# Patient Record
Sex: FEMALE | Race: BLACK OR AFRICAN AMERICAN | NOT HISPANIC OR LATINO | Employment: UNEMPLOYED | URBAN - METROPOLITAN AREA
[De-identification: names, ages, dates, MRNs, and addresses within clinical notes are randomized per-mention and may not be internally consistent; named-entity substitution may affect disease eponyms.]

---

## 2022-05-17 ENCOUNTER — APPOINTMENT (OUTPATIENT)
Dept: GENERAL RADIOLOGY | Facility: HOSPITAL | Age: 27
End: 2022-05-17

## 2022-05-17 ENCOUNTER — HOSPITAL ENCOUNTER (EMERGENCY)
Facility: HOSPITAL | Age: 27
Discharge: HOME | End: 2022-05-17
Attending: EMERGENCY MEDICINE | Admitting: EMERGENCY MEDICINE

## 2022-05-17 VITALS
WEIGHT: 200 LBS | DIASTOLIC BLOOD PRESSURE: 78 MMHG | RESPIRATION RATE: 16 BRPM | BODY MASS INDEX: 30.31 KG/M2 | SYSTOLIC BLOOD PRESSURE: 128 MMHG | HEART RATE: 88 BPM | OXYGEN SATURATION: 97 % | HEIGHT: 68 IN | TEMPERATURE: 97.4 F
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
## 2022-05-17 DIAGNOSIS — S52.611A CLOSED DISPLACED FRACTURE OF STYLOID PROCESS OF RIGHT ULNA, INITIAL ENCOUNTER: ICD-10-CM

## 2022-05-17 DIAGNOSIS — S52.501A CLOSED FRACTURE OF DISTAL END OF RIGHT RADIUS, UNSPECIFIED FRACTURE MORPHOLOGY, INITIAL ENCOUNTER: Primary | ICD-10-CM

## 2022-05-17 PROCEDURE — 73070 X-RAY EXAM OF ELBOW: CPT

## 2022-05-17 PROCEDURE — 96374 THER/PROPH/DIAG INJ IV PUSH: CPT

## 2022-05-17 PROCEDURE — 25010000002 ONDANSETRON PER 1 MG: Performed by: EMERGENCY MEDICINE

## 2022-05-17 PROCEDURE — 73100 X-RAY EXAM OF WRIST: CPT

## 2022-05-17 PROCEDURE — 99283 EMERGENCY DEPT VISIT LOW MDM: CPT

## 2022-05-17 PROCEDURE — 25010000002 FENTANYL CITRATE (PF) 50 MCG/ML SOLUTION: Performed by: EMERGENCY MEDICINE

## 2022-05-17 PROCEDURE — 96375 TX/PRO/DX INJ NEW DRUG ADDON: CPT

## 2022-05-17 PROCEDURE — 25010000002 MORPHINE PER 10 MG: Performed by: EMERGENCY MEDICINE

## 2022-05-17 PROCEDURE — 73110 X-RAY EXAM OF WRIST: CPT

## 2022-05-17 RX ORDER — ONDANSETRON 2 MG/ML
4 INJECTION INTRAMUSCULAR; INTRAVENOUS ONCE
Status: COMPLETED | OUTPATIENT
Start: 2022-05-17 | End: 2022-05-17

## 2022-05-17 RX ORDER — FENTANYL CITRATE 50 UG/ML
100 INJECTION, SOLUTION INTRAMUSCULAR; INTRAVENOUS
Status: DISCONTINUED | OUTPATIENT
Start: 2022-05-17 | End: 2022-05-18 | Stop reason: HOSPADM

## 2022-05-17 RX ORDER — CETIRIZINE HYDROCHLORIDE 10 MG/1
10 TABLET ORAL DAILY
COMMUNITY

## 2022-05-17 RX ORDER — HYDROCODONE BITARTRATE AND ACETAMINOPHEN 7.5; 325 MG/1; MG/1
1 TABLET ORAL EVERY 6 HOURS PRN
Qty: 12 TABLET | Refills: 0 | Status: SHIPPED | OUTPATIENT
Start: 2022-05-17

## 2022-05-17 RX ORDER — MORPHINE SULFATE 2 MG/ML
4 INJECTION, SOLUTION INTRAMUSCULAR; INTRAVENOUS ONCE
Status: COMPLETED | OUTPATIENT
Start: 2022-05-17 | End: 2022-05-17

## 2022-05-17 RX ORDER — HYDROCODONE BITARTRATE AND ACETAMINOPHEN 7.5; 325 MG/1; MG/1
1 TABLET ORAL ONCE
Status: COMPLETED | OUTPATIENT
Start: 2022-05-17 | End: 2022-05-17

## 2022-05-17 RX ADMIN — FENTANYL CITRATE 100 MCG: 0.05 INJECTION, SOLUTION INTRAMUSCULAR; INTRAVENOUS at 22:46

## 2022-05-17 RX ADMIN — HYDROCODONE BITARTRATE AND ACETAMINOPHEN 1 TABLET: 7.5; 325 TABLET ORAL at 22:17

## 2022-05-17 RX ADMIN — ONDANSETRON 4 MG: 2 INJECTION INTRAMUSCULAR; INTRAVENOUS at 21:36

## 2022-05-17 RX ADMIN — MORPHINE SULFATE 4 MG: 2 INJECTION, SOLUTION INTRAMUSCULAR; INTRAVENOUS at 21:35

## 2022-05-18 NOTE — ED PROVIDER NOTES
EMERGENCY DEPARTMENT ENCOUNTER    Room Number: 02/02  Date seen: 5/17/2022  Time seen: 21:01 EDT  PCP: Provider, No Known    Spoken Language:  English  Language interpretation services not needed     CHIEF COMPLAINT: right wrist pain    HPI: Bello Regan is a 27 y.o. female presenting to the ED for evaluation of right wrist pain. The history is being obtained by the patient and by review of the medical chart.  The patient states that she was rollerskating prior to presentation to the emergency department whenever she fell backwards.  She states that she fell, landing on her right wrist.  She presents to the emergency department complaining of constant and severe pain in the right wrist.  She rates the severity as 10/10.  It is increased with any movement.  She denies any relieving factors.  The patient is not on any antiplatelet or anticoagulant therapy.  She did not hit her head.  She denies loss of consciousness.  She denies feeling dizzy or lightheaded prior to her fall.  She denies any additional injuries.    MEDICAL RECORD REVIEW:  Reviewed in [facility].     PAST MEDICAL HISTORY  Past Medical History:   Diagnosis Date   • Alopecia        PAST SURGICAL HISTORY  Past Surgical History:   Procedure Laterality Date   • WISDOM TOOTH EXTRACTION         FAMILY HISTORY  History reviewed. No pertinent family history.    SOCIAL HISTORY  Social History     Socioeconomic History   • Marital status: Single   Tobacco Use   • Smoking status: Never Smoker   • Smokeless tobacco: Never Used   Substance and Sexual Activity   • Alcohol use: Yes     Comment: socially   • Drug use: Yes     Types: Marijuana     Comment: occ   • Sexual activity: Defer       CURRENT MEDICATIONS  Prior to Admission medications    Not on File       ALLERGIES  Patient has no known allergies.    REVIEW OF SYSTEMS  All systems reviewed and negative except for those discussed in HPI.     PHYSICAL EXAM  ED Triage Vitals [05/17/22 2051]   Temp Heart Rate Resp BP SpO2    97.4 °F (36.3 °C) 112 20 124/95 99 %      Temp src Heart Rate Source Patient Position BP Location FiO2 (%)   Tympanic Monitor Standing Right arm --       Physical Exam  Constitutional:       Appearance: Normal appearance.   HENT:      Head: Normocephalic and atraumatic.      Mouth/Throat:      Mouth: Mucous membranes are moist.   Eyes:      Extraocular Movements: Extraocular movements intact.      Pupils: Pupils are equal, round, and reactive to light.   Cardiovascular:      Rate and Rhythm: Normal rate and regular rhythm.   Pulmonary:      Effort: Pulmonary effort is normal.      Breath sounds: Normal breath sounds.   Musculoskeletal:      Cervical back: Normal range of motion.      Comments: Obvious deformity noted to the right wrist with moderate swelling.  The patient has normal capillary refill and normal sensation to light touch to the fingers of the right hand.  2+ radial and ulnar pulses at the right wrist.   Skin:     General: Skin is warm and dry.   Neurological:      General: No focal deficit present.      Mental Status: She is alert and oriented to person, place, and time.   Psychiatric:         Mood and Affect: Mood normal.         Behavior: Behavior normal.         Thought Content: Thought content normal.         Judgment: Judgment normal.         PROCEDURES  FX Dislocation    Date/Time: 5/17/2022 11:40 PM  Performed by: Elaina Carty PA  Authorized by: Garfield Briggs MD     Consent:     Consent obtained:  Verbal    Consent given by:  Patient    Risks, benefits, and alternatives were discussed: yes      Alternatives discussed:  No treatment  Universal protocol:     Imaging studies available: yes      Patient identity confirmed:  Verbally with patient and arm band  Injury:     Injury location:  Wrist    Wrist injury location:  R wrist    Wrist fracture type comment:  Distal radius, ulnar styloid  Pre-procedure details:     Distal neurologic exam:  Normal    Distal perfusion: distal pulses  strong and brisk capillary refill      Range of motion: reduced    Sedation:     Sedation type:  None  Anesthesia:     Anesthesia method:  None  Procedure details:     Skin traction used: yes      Immobilization:  Splint    Splint type:  Sugar tong    Supplies used:  Sling    Additional details:  Ortho-Glass    Attestation: Splint applied and adjusted personally by me    Post-procedure details:     Distal neurologic exam:  Normal    Distal perfusion: distal pulses strong      Range of motion: unchanged      Procedure completion:  Tolerated      LABS  No results found for this or any previous visit (from the past 24 hour(s)).    RADIOLOGY  XR Wrist 3+ View Right   Final Result   As above.       This report was finalized on 5/17/2022 11:18 PM by Dr. Lottie Gutierrez M.D.          XR Wrist 2 View Right   Final Result   Comminuted fractures of the distal radius and ulnar styloid.       This report was finalized on 5/17/2022 9:58 PM by Dr. Lottie Gutierrez M.D.          XR Elbow 2 View Right   Final Result   No acute fracture or subluxation identified.       This report was finalized on 5/17/2022 10:24 PM by Dr. Lottie Gutierrez M.D.              MEDICATIONS GIVEN IN THE ER  Medications   fentaNYL citrate (PF) (SUBLIMAZE) injection 100 mcg (100 mcg Intravenous Given 5/17/22 2246)   morphine injection 4 mg (4 mg Intravenous Given by Other 5/17/22 2135)   ondansetron (ZOFRAN) injection 4 mg (4 mg Intravenous Given 5/17/22 2136)   HYDROcodone-acetaminophen (NORCO) 7.5-325 MG per tablet 1 tablet (1 tablet Oral Given 5/17/22 2217)       MEDICAL DECISION MAKING, CONSULTS AND PROGRESS NOTES  All labs and all radiology studies were have been viewed and interpreted by me.   Discussion below represents my analysis of pertinent findings related to patient's condition, differential diagnosis, treatment plan and final disposition.    Differential diagnosis includes but is not limited to:  - contusion  - wrist sprain  - bony  fracture    PPE: The patient was placed in a face mask in first look. Patient was wearing facemask when I entered the room and throughout our encounter. I wore full protective equipment throughout this patient encounter including a face mask, eye protection and gloves. Hand hygiene was performed before donning protective equipment and after removal when leaving the room.    AS OF 23:43 EDT VITALS:    BP - 130/86  HR - 112  TEMP - 97.4 °F (36.3 °C) (Tympanic)  O2 SATS - 99%         The patient's history, physical exam, and lab findings were discussed with the physician, who also performed a face to face history and physical exam.  I discussed all results and noted any abnormalities with patient.  Discussed absoute need to recheck abnormalities with their family physician.  I answered any of the patient's questions.  Discussed plan for discharge, as there is no emergent indication for admission.  Pt is agreeable and understands need for follow up and repeat testing.  Pt is aware that discharge does not mean that nothing is wrong but it indicates no emergency is present and they must continue care with their family physician.  Pt is discharged with instructions to follow up with primary care doctor to have their blood pressure rechecked.     DIAGNOSIS   Diagnosis Plan   1. Closed fracture of distal end of right radius, unspecified fracture morphology, initial encounter  HYDROcodone-acetaminophen (NORCO) 7.5-325 MG per tablet   2. Closed displaced fracture of styloid process of right ulna, initial encounter  HYDROcodone-acetaminophen (NORCO) 7.5-325 MG per tablet   3. Fall, initial encounter         DISPOSITION  ED Disposition     ED Disposition   Discharge    Condition   Stable    Comment   --             FOLLOW UP  Sandor Foreman MD  [address] [address]  [phone]      Call tomorrow to make an appointment to be seen      RX  Medications   fentaNYL citrate (PF) (SUBLIMAZE)  injection 100 mcg (100 mcg Intravenous Given 5/17/22 2246)   morphine injection 4 mg (4 mg Intravenous Given by Other 5/17/22 2135)   ondansetron (ZOFRAN) injection 4 mg (4 mg Intravenous Given 5/17/22 2136)   HYDROcodone-acetaminophen (NORCO) 7.5-325 MG per tablet 1 tablet (1 tablet Oral Given 5/17/22 2217)          Medication List      New Prescriptions    HYDROcodone-acetaminophen 7.5-325 MG per tablet  Commonly known as: NORCO  Take 1 tablet by mouth Every 6 (Six) Hours As Needed for Moderate Pain .           Where to Get Your Medications      These medications were sent to [id number] - [place], [place] - [address] [place] [address] AT [facility] [address]  [address] - [phone] [facility] [phone] [address] [address]    Phone: [phone]   · HYDROcodone-acetaminophen 7.5-325 MG per tablet       Provider Attestation:  I personally reviewed the past medical history, past surgical history, social history, family history, current medications and allergies as they appear in the chart. I reviewed the patient's history, physical, lab/imaging results and overall care with Dr. Briggs who is in agreement with the patient's treatment plan.    EMR Dragon/Transcription disclaimer:  Dictated using Dragon dictation    Provider note signed by:         Elaina Carty PA  05/17/22 3602

## 2022-05-18 NOTE — ED PROVIDER NOTES
MD ATTESTATION NOTE    The NIRMAL and I have discussed this patient's history, physical exam, and treatment plan.  I have reviewed the documentation and personally had a face to face interaction with the patient. I affirm the documentation and agree with the treatment and plan.  The attached note describes my personal findings.    I provided a substantive portion of the care of this patient. I personally performed the physical exam, in its entirety.    Bello Regna is a 27 y.o. female who presents to the ED c/o right wrist pain.  She reports this occurred just prior to arrival.  She was skating and she fell.  She reports injury to her right distal wrist.  She has no weakness or numbness.  She denies other injury.  Movement makes it worse and immobilization makes it better.      On exam:  GENERAL: Awake, alert, no acute distress  SKIN: Warm, dry  HENT: Normocephalic, atraumatic  EYES: no scleral icterus  CV: regular rhythm, regular rate  RESPIRATORY: normal effort, lungs clear  ABDOMEN: soft, nontender, nondistended  MUSCULOSKELETAL: Mild deformity to the distal right wrist.  Cap refill, sensation, motor intact all digits.  NEURO: alert, moves all extremities, follows commands    Labs  No results found for this or any previous visit (from the past 24 hour(s)).    Radiology  XR Elbow 2 View Right    Result Date: 5/17/2022  2 VIEWS RIGHT ELBOW  HISTORY: Right upper extremity pain after a fall  COMPARISON: None available.  FINDINGS: No acute fracture or subluxation of the right elbow is seen. There is no elbow effusion. There is no significant degenerative change.      No acute fracture or subluxation identified.  This report was finalized on 5/17/2022 10:24 PM by Dr. Lottie Gutierrez M.D.      XR Wrist 2 View Right    Result Date: 5/17/2022  2 VIEWS RIGHT WRIST  HISTORY: Right wrist pain  COMPARISON: None available.  FINDINGS: The patient has a comminuted impacted fracture of the distal radius, as well as a comminuted  fracture of the ulnar styloid. Both fractures are displaced. The radial fracture does extend to the articular surface. Carpal bones appear intact. There is soft tissue swelling noted about the wrist.      Comminuted fractures of the distal radius and ulnar styloid.  This report was finalized on 5/17/2022 9:58 PM by Dr. Lottie Gutierrez M.D.        Medical Decision Making:       Plan to x-ray her right wrist.  Plan to provide her pain medication for pain control.  I suspect she has a fracture and we will determine based on x-rays whether she needs reduction or not prior to splinting.    PPE: The patient wore a mask and I wore an N95 mask throughout the entire patient encounter.      The patient has started, but not completed, their COVID-19 vaccination series.    Diagnosis  Final diagnoses:   None        Garfield Briggs MD  05/17/22 7063

## 2022-05-18 NOTE — DISCHARGE INSTRUCTIONS
Although you are being discharged from the ED today, I encourage you to return for worsening symptoms. Things can, and do, change such that treatment at home with medication may not be adequate. Specifically I recommend returning for chest pain or discomfort, difficulty breathing, persistent vomiting or difficulty holding down liquids or medications, fever > 102.0 F or any other worsening or alarming symptoms.     Rest. Drink plenty of fluids.  Follow up with Dr. Foreman (orthopedic surgeon) for further evaluation and management.  Follow up with primary care provider for further management and to have blood pressure rechecked.  Take your medications as prescribed.  Do not drive, work, operate heavy machinery, or otherwise engage in any risky behavior while on narcotic pain medications or other sedating drugs. Do not sign any legal paperwork within 24 hours of taking narcotic pain medications or other sedating drugs. These medications may impair judgement and/or motor capabilities.    Narcotic Medication Notice:    Today you are receiving a medication with properties that cause sedation and/or drowsiness. Please use caution when taking this medication to avoid situations where decreased concentration or sleepiness may affect your performance of the task at hand. This includes driving vehicles, operating machinery, or any other task where your personal safety or the safety of others can be a risk. I do not recommend continuing any these activities while this medication until you know how it can affect you.     Also please be aware that these medications have been known to be habit forming. This means that even when taken as prescribed they can be addictive. Please pay attention to how and when you take this medication and be sensitive to the fact you may become addicted. Take only as directed. Do not change the manner in which you take this medication until you discuss with a licensed physician. Should addiction  occur I recommend discussing with your primary care doctor or return to the emergency department.    Lastly, the emergency department typically does not refill pain medication prescriptions due to limitations set out in [place] state laws. Any necessary refills need to be discussed with your primary care provider and/or pain management provider.    It is a [place] state law that you take your narcotic medication as prescribed and do not take any excessive pills. It is illegal to obtain prescriptions for any narcotic medications from multiple providers at the same time.

## 2022-05-18 NOTE — ED NOTES
"Pt arrives ambulatory to triage desk via PV.    Pt states \" I think I broke my wrist. I was skating and I fell.\"    This RN notes an obvious deformity, with swelling.    Patient was placed in face mask during first look triage.  Patient was wearing a face mask throughout encounter.  I wore personal protective equipment throughout the encounter.  Hand hygiene was performed before and after patient encounter.    "

## 2022-05-24 ENCOUNTER — LAB (OUTPATIENT)
Dept: LAB | Facility: HOSPITAL | Age: 27
End: 2022-05-24

## 2022-05-24 ENCOUNTER — HOSPITAL ENCOUNTER (OUTPATIENT)
Dept: CARDIOLOGY | Facility: HOSPITAL | Age: 27
Discharge: HOME | End: 2022-05-24

## 2022-05-24 ENCOUNTER — TRANSCRIBE ORDERS (OUTPATIENT)
Dept: ADMINISTRATIVE | Facility: HOSPITAL | Age: 27
End: 2022-05-24

## 2022-05-24 DIAGNOSIS — Z01.818 PRE-OP EXAM: ICD-10-CM

## 2022-05-24 DIAGNOSIS — Z01.818 PRE-OP EXAM: Primary | ICD-10-CM

## 2022-05-24 LAB
ALBUMIN SERPL-MCNC: 4.3 G/DL (ref 3.5–5.2)
ALBUMIN/GLOB SERPL: 1.5 G/DL
ALP SERPL-CCNC: 54 U/L (ref 39–117)
ALT SERPL W P-5'-P-CCNC: 15 U/L (ref 1–33)
ANION GAP SERPL CALCULATED.3IONS-SCNC: 13 MMOL/L (ref 5–15)
AST SERPL-CCNC: 16 U/L (ref 1–32)
BILIRUB SERPL-MCNC: <0.2 MG/DL (ref 0–1.2)
BUN SERPL-MCNC: 10 MG/DL (ref 6–20)
BUN/CREAT SERPL: 12.7 (ref 7–25)
CALCIUM SPEC-SCNC: 9.5 MG/DL (ref 8.6–10.5)
CHLORIDE SERPL-SCNC: 104 MMOL/L (ref 98–107)
CO2 SERPL-SCNC: 20 MMOL/L (ref 22–29)
CREAT SERPL-MCNC: 0.79 MG/DL (ref 0.57–1)
DEPRECATED RDW RBC AUTO: 37.9 FL (ref 37–54)
EGFRCR SERPLBLD CKD-EPI 2021: 105.3 ML/MIN/1.73
EOSINOPHIL # BLD MANUAL: 0.17 10*3/MM3 (ref 0–0.4)
EOSINOPHIL NFR BLD MANUAL: 3 % (ref 0.3–6.2)
ERYTHROCYTE [DISTWIDTH] IN BLOOD BY AUTOMATED COUNT: 15 % (ref 12.3–15.4)
GLOBULIN UR ELPH-MCNC: 2.9 GM/DL
GLUCOSE SERPL-MCNC: 77 MG/DL (ref 65–99)
HCT VFR BLD AUTO: 37.4 % (ref 34–46.6)
HGB BLD-MCNC: 12.3 G/DL (ref 12–15.9)
LYMPHOCYTES # BLD MANUAL: 2.8 10*3/MM3 (ref 0.7–3.1)
LYMPHOCYTES NFR BLD MANUAL: 6 % (ref 5–12)
MCH RBC QN AUTO: 23.7 PG (ref 26.6–33)
MCHC RBC AUTO-ENTMCNC: 32.9 G/DL (ref 31.5–35.7)
MCV RBC AUTO: 71.9 FL (ref 79–97)
MICROCYTES BLD QL: ABNORMAL
MONOCYTES # BLD: 0.35 10*3/MM3 (ref 0.1–0.9)
NEUTROPHILS # BLD AUTO: 2.51 10*3/MM3 (ref 1.7–7)
NEUTROPHILS NFR BLD MANUAL: 43 % (ref 42.7–76)
NRBC BLD AUTO-RTO: 0 /100 WBC (ref 0–0.2)
PLAT MORPH BLD: NORMAL
PLATELET # BLD AUTO: 320 10*3/MM3 (ref 140–450)
PMV BLD AUTO: 9.9 FL (ref 6–12)
POTASSIUM SERPL-SCNC: 4.1 MMOL/L (ref 3.5–5.2)
PROT SERPL-MCNC: 7.2 G/DL (ref 6–8.5)
RBC # BLD AUTO: 5.2 10*6/MM3 (ref 3.77–5.28)
SARS-COV-2 ORF1AB RESP QL NAA+PROBE: NOT DETECTED
SODIUM SERPL-SCNC: 137 MMOL/L (ref 136–145)
VARIANT LYMPHS NFR BLD MANUAL: 48 % (ref 19.6–45.3)
WBC MORPH BLD: NORMAL
WBC NRBC COR # BLD: 5.83 10*3/MM3 (ref 3.4–10.8)

## 2022-05-24 PROCEDURE — 85025 COMPLETE CBC W/AUTO DIFF WBC: CPT

## 2022-05-24 PROCEDURE — C9803 HOPD COVID-19 SPEC COLLECT: HCPCS

## 2022-05-24 PROCEDURE — 36415 COLL VENOUS BLD VENIPUNCTURE: CPT

## 2022-05-24 PROCEDURE — 85007 BL SMEAR W/DIFF WBC COUNT: CPT

## 2022-05-24 PROCEDURE — U0004 COV-19 TEST NON-CDC HGH THRU: HCPCS

## 2022-05-24 PROCEDURE — 80053 COMPREHEN METABOLIC PANEL: CPT

## 2022-05-25 PROBLEM — S52.531A CLOSED COLLES' FRACTURE OF RIGHT RADIUS: Status: ACTIVE | Noted: 2022-05-25

## 2022-05-25 RX ORDER — NORELGESTROMIN AND ETHINYL ESTRADIOL 150; 35 UG/D; UG/D
1 PATCH TRANSDERMAL WEEKLY
COMMUNITY
Start: 2022-05-21

## 2022-05-25 NOTE — DISCHARGE INSTRUCTIONS
Orthopaedic & Hand Surgery  Discharge Instructions  Dr. Robert Crook  [phone]    INCISION CARE  The postoperative dressings should be left in place until your follow-up appointment.  You will receive instructions at this appointment on whether your injury requires continued immobilization.  Your surgeon will instruct you regarding suture or staple removal. In general, this happens at the 1-2-week postoperative appointment.  Once postoperative splinting is discontinued, new dry dressings can then be placed around the wound and held in place with an Ace wrap.   Dressings should be changed at least daily or if visibly soiled.  Wash your hands prior to dressing changes  No creams or ointments to the incision until 3 weeks post op.   You may remove dressing once the incision is completely free of drainage.  Do not touch or pick at the incision  Check incision every day and notify surgeon immediately if any of the following signs or symptoms are seen:  Increase in redness  Increase in swelling around the incision and of the entire extremity  Increase in pain  NEW drainage or oozing from the incision  Pulling apart of the edges of the incision  Increase in overall body temperature (greater than 100.4°F)    ACTIVITIES  Exercises:  Physical therapy may or may not be needed after surgery. Once some healing has occurred, it will be possible to start therapy if you wish. However, most patients get their function back fairly well after surgery without formal therapy.  Activities of Daily Living:   Showering may begin immediately if the postoperative dressing can be protected. The dressing/splint and incision cannot get wet after surgery.   No tub baths, hot tubs, or swimming pools for 4 weeks.  May shower and let water run over the incision once the sutures are removed if there is no drainage and the wound is well healing. A new dry dressing can be applied after showering.       Restrictions  Weight: Do not put  weight on the injured arm until instructed to do so. Your surgeon will discuss with restrictions in terms of activities allowed. In general, anything more strenuous than holding a pen or piece of paper should be avoided, the other hand should do the vast majority of the work until the injured side had healed enough that it is not painful.  Driving: Many patients have questions about when it is safe to return to driving. The answer is that this is extremely variable. It depends on how quickly you heal. Until you can safely navigate the steering wheel, and are off of all narcotics, driving is not permitted. Your surgeon cannot “clear” you to return to driving, only you can make the decision when you feel it is safe.     Pain Control  Ice:  Ice is an excellent pain reliever. This can be used regardless of whether or not you are taking pain medication.  Apply an ice pack to the surgical area for 20 minutes at a time, removing it for at least an hour to prevent frostbite.  You should keep a towel between any dressings on the ice pack to prevent them from getting damp and from developing frostbite on the operative site.  Elevation:  Elevation is another easy way to control pain after surgery. Whenever possible, keep the operative limb elevated above the level of your heart to reduce swelling.  Acetaminophen (Tylenol):  CLASSIFICATION: A non-narcotic medication that is available without a prescription.  Acetaminophen controls pain but does not affect swelling or inflammation.  DRIVING: Acetaminophen will not impair your ability to drive. It is safe to drive while taking if your physical condition does not limit you.  POTENTIAL SIDE EFFECTS: nausea, stomach upset, liver failure if taken in large doses.  Interactions: Some narcotic medications contain acetaminophen. If you have a narcotic prescription, make sure to cut back on the acetaminophen if you are taking the narcotic. You should never take more 3000 mg of  acetaminophen in one 24-hour period.  DOSAGE:  Following surgery, you may take two regular strength (325 mg) tabs to control pain every 6 hours. This can be taken with NSAIDs (see below) or alternating the two.  After the initial surgical pain begins to resolve, you may begin to decrease the pain medication. By the end of a few weeks, you should be off of pain medications.  NSAIDS: This includes aspirin, ibuprofen, naproxen, Motrin, Aleve, Mobic, Celebrex  CLASSIFICATION: These are non-narcotic medications that are available without a prescription.  They are particularly effective at reducing swelling and inflammation  DRIVING: These medications will not impair your ability to drive. It is safe to drive while taking these medications if your physical condition does not limit you.  POTENTIAL SIDE EFFECTS: nausea, stomach upset, ulcer, gastric bleeding, kidney failure.  DOSAGE:  Following surgery, you may take ibuprofen (Motrin) 600 mg to control pain every 6 hours with food. It helps to take it scheduled (around the clock) to allow it to help reduce swelling.  After the initial surgical pain begins to resolve, you may begin to decrease the pain medication. By the end of a few weeks, you should be off of pain medications.  Narcotic Pain Medications: Utilized after surgery. This is some general information about these medications.  CLASSIFICATION: Prescription pain medications are called Opioids and are narcotics  LEGALITIES: It is illegal to share narcotics with others  DRIVING: it is illegal to drive while under the influence of narcotics. Doing so is a DUI.  POTENTIAL SIDE EFFECTS: nausea, vomiting, itching, dizziness, drowsiness, dry mouth, constipation, and difficulty urinating.  POTENTIAL ADVERSE EFFECTS:  Opioid tolerance can develop with use of pain medications and this simply means that it requires more and more of the medication to control pain. However, this is seen more in patients that use opioids for  longer periods of time.  Opioid dependence can develop with use of Opioids. People with opioid dependence will experience withdrawal symptoms upon cessation of the medication.  Opioid addiction can develop with use of Opioids. The incidence of this is very unlikely in patients who take the medications as ordered and stop the medications as instructed.  Opioid overdose can be dangerous, but is unlikely when the medication is taken as ordered and stopped when ordered. It is important not to mix opioids with alcohol as this can lead to over sedation and respiratory difficulty.  DOSAGE:  After the initial surgical pain begins to resolve, you should begin to decrease the pain medication dose and frequency. By the end of a few weeks, you should be off of narcotic pain medications.  Refills will not be given by the office during evening hours, on weekends, or after 6 weeks post-op. You are responsible for weaning off of pain medication.  To seek refills on pain medications during the initial 6-week post-operative period, you must call the office to request the refill. The office will then notify you when to  the prescription. DO NOT wait until you are out of the medication to request a refill. Prescriptions will not be filled over the weekend and it may take a couple days for the prescription to be available. Someone will have to pick the prescription in person at the office.    Other Medications  Anticoagulants: After upper extremity surgery most patients do not require an anticoagulant unless you have another injury that will be keeping you from mobilizing.  If you were already taking an anticoagulant (commonly Aspirin, Coumadin, or Plavix) you will likely be resuming your normal dose postoperatively and will be continuing that medication at the discretion of the prescribing physician.  Stool Softeners: You will be at greater risk of constipation after surgery due to being less mobile and the narcotic pain  medications.  Take stool softeners as needed. Over the counter Colace 100 mg 1-2 capsules twice daily can be taken.  If stools become too loose or too frequent, please decreases the dosage or stop the stool softener.  If constipation occurs despite use of stool softeners, you are to continue the stool softeners and add a laxative (Milk of Magnesia 1 ounce daily as needed)  Drink plenty of fluids, and eat fruits and vegetables during your recovery time. Getting up and mobilizing will help the bowels to recover their regular function, as will weaning off of all narcotics when the pain becomes tolerable.    FOLLOW-UP VISITS  You will need to follow up in the office with your surgeon in 1-2 weeks, or as instructed elsewhere in your discharge paperwork. Please call this number [phone] to schedule this appointment if one has not already been made.   If you have any concerns or suspected complications prior to your follow up visit, please call the office. Do not wait until your appointment time if you suspect complications. These will need to be addressed in the office promptly.      Robert Crook MD, PhD  Orthopaedic Surgery  [facility]         What to expect after a Nerve Block    Nerve blocks administered to block pain affect many types of nerves, including those nerves that control movement, pain, and normal sensation. Following a nerve block, you may notice some bruising at the site where the block was given. You may experience sensations such as: numbness of the affected area or limb, tingling, heaviness (that is the limb feels heavy to you), weakness or inability to move the affected arm or leg, or a feeling as if your arm or leg has “fallen asleep.”     A nerve block can last from 2 to 36 hours depending on the medications used.  Usually the weakness wears off first followed by the tingling and heaviness. As the block wears off, you may begin to notice pain; however, this sequence of  events may occur in any order. Typically, you will be able to move your limb before you will feel it. Once a nerve block begins to wear off, the effects are usually completely gone within 60 minutes.  If you experience continued side effects that you believe are block related for longer than 48 hours, please call your healthcare provider. Please see block-specific instructions below.    Instructions for any block involving the shoulder or arm  If you have had any kind of shoulder/arm block, you will go home with your arm in a sling. Wear the sling until the block has completely worn off. You may be required to wear it for a longer period of time per your surgeon’s recommendations.  If you have had a shoulder/arm block, it is a good idea to sleep on a recliner with pillows under your arm.    You may experience symptoms such as:  Shortness of breath  Hoarseness   Blurry vision  Unequal pupils  Drooping of your face on the same side as the block was performed    These are side effects associated with this kind of block and should go away within 12 hours.    Note: If you have severe or prolonged shortness of breath, please seek medical assistance as soon as possible.     Protection of a “blocked” arm or leg (limb)  After a nerve block, you cannot feel pain, pressure, or extremes of temperature in the affected limb. And because of this, your blocked limb is at more risk for injury. For example, it is possible to burn your limb on an extremely hot surface without feeling it.     When resting, it is important to reposition your limb periodically to avoid prolonged pressure on it. This may require the use of pillows and padding.    While sleeping, you should avoid rolling onto the affected limb or putting too much pressure on it.     If you have a cast or tight dressing, check the color of your fingers or toes of the affected limb. Call your surgeon if they look discolored (that is, dusky, dark colored).    Use caution in  cold weather. Cover your limb appropriately to protect it from the cold.      Pain Management:    Your surgeon will give you a prescription for pain medication. Begin taking this before the nerve block wears off. Bear in mind that sometimes the block can wear off in the middle of the night.

## 2022-05-26 ENCOUNTER — APPOINTMENT (OUTPATIENT)
Dept: GENERAL RADIOLOGY | Facility: HOSPITAL | Age: 27
End: 2022-05-26

## 2022-05-26 ENCOUNTER — HOSPITAL ENCOUNTER (OUTPATIENT)
Facility: HOSPITAL | Age: 27
Setting detail: HOSPITAL OUTPATIENT SURGERY
Discharge: HOME | End: 2022-05-26
Attending: STUDENT IN AN ORGANIZED HEALTH CARE EDUCATION/TRAINING PROGRAM | Admitting: STUDENT IN AN ORGANIZED HEALTH CARE EDUCATION/TRAINING PROGRAM

## 2022-05-26 ENCOUNTER — ANESTHESIA EVENT (OUTPATIENT)
Dept: PERIOP | Facility: HOSPITAL | Age: 27
End: 2022-05-26

## 2022-05-26 ENCOUNTER — ANESTHESIA (OUTPATIENT)
Dept: PERIOP | Facility: HOSPITAL | Age: 27
End: 2022-05-26

## 2022-05-26 VITALS
OXYGEN SATURATION: 99 % | HEART RATE: 82 BPM | TEMPERATURE: 98.6 F | RESPIRATION RATE: 18 BRPM | DIASTOLIC BLOOD PRESSURE: 86 MMHG | SYSTOLIC BLOOD PRESSURE: 145 MMHG

## 2022-05-26 DIAGNOSIS — S52.531A CLOSED COLLES' FRACTURE OF RIGHT RADIUS, INITIAL ENCOUNTER: Primary | ICD-10-CM

## 2022-05-26 LAB
B-HCG UR QL: NEGATIVE
EXPIRATION DATE: NORMAL
INTERNAL NEGATIVE CONTROL: NORMAL
INTERNAL POSITIVE CONTROL: NORMAL
Lab: NORMAL

## 2022-05-26 PROCEDURE — 25010000002 DEXAMETHASONE PER 1 MG: Performed by: ANESTHESIOLOGY

## 2022-05-26 PROCEDURE — 25010000002 FENTANYL CITRATE (PF) 50 MCG/ML SOLUTION: Performed by: ANESTHESIOLOGY

## 2022-05-26 PROCEDURE — C1713 ANCHOR/SCREW BN/BN,TIS/BN: HCPCS | Performed by: STUDENT IN AN ORGANIZED HEALTH CARE EDUCATION/TRAINING PROGRAM

## 2022-05-26 PROCEDURE — 76000 FLUOROSCOPY <1 HR PHYS/QHP: CPT

## 2022-05-26 PROCEDURE — 25010000002 MIDAZOLAM PER 1 MG: Performed by: ANESTHESIOLOGY

## 2022-05-26 PROCEDURE — 76942 ECHO GUIDE FOR BIOPSY: CPT | Performed by: STUDENT IN AN ORGANIZED HEALTH CARE EDUCATION/TRAINING PROGRAM

## 2022-05-26 PROCEDURE — 73100 X-RAY EXAM OF WRIST: CPT

## 2022-05-26 PROCEDURE — 25010000002 CEFAZOLIN IN DEXTROSE 2-4 GM/100ML-% SOLUTION: Performed by: STUDENT IN AN ORGANIZED HEALTH CARE EDUCATION/TRAINING PROGRAM

## 2022-05-26 PROCEDURE — 81025 URINE PREGNANCY TEST: CPT | Performed by: ANESTHESIOLOGY

## 2022-05-26 PROCEDURE — 25010000002 PROPOFOL 10 MG/ML EMULSION: Performed by: NURSE ANESTHETIST, CERTIFIED REGISTERED

## 2022-05-26 PROCEDURE — 25010000002 ONDANSETRON PER 1 MG: Performed by: NURSE ANESTHETIST, CERTIFIED REGISTERED

## 2022-05-26 PROCEDURE — 25010000002 ROPIVACAINE PER 1 MG: Performed by: ANESTHESIOLOGY

## 2022-05-26 PROCEDURE — 0 MEPIVACAINE HCL (PF) 1.5 % SOLUTION: Performed by: ANESTHESIOLOGY

## 2022-05-26 DEVICE — 2.5 CORTICAL SCREW 14MM, HD7, 1/PKG
Type: IMPLANTABLE DEVICE | Site: WRIST | Status: FUNCTIONAL
Brand: APTUS

## 2022-05-26 DEVICE — 2.5 TRILOCK SCREW 18MM, HD7, 1/PKG
Type: IMPLANTABLE DEVICE | Site: WRIST | Status: FUNCTIONAL
Brand: APTUS

## 2022-05-26 DEVICE — 2.5 TRILOCK EXPRESS SCREW 18MM,HD7,5/PKG
Type: IMPLANTABLE DEVICE | Site: WRIST | Status: FUNCTIONAL
Brand: APTUS

## 2022-05-26 DEVICE — 2.5 TRILOCK EXPRESS SCREW 14MM,HD7,1/PKG
Type: IMPLANTABLE DEVICE | Site: WRIST | Status: FUNCTIONAL
Brand: APTUS

## 2022-05-26 DEVICE — 2.5 ADAPTIVE II TRILOCK DISTRAD.PL VOL R
Type: IMPLANTABLE DEVICE | Site: WRIST | Status: FUNCTIONAL
Brand: APTUS

## 2022-05-26 DEVICE — 2.5 TRILOCK SCREW 20MM, HD7, 1/PKG
Type: IMPLANTABLE DEVICE | Site: WRIST | Status: FUNCTIONAL
Brand: APTUS

## 2022-05-26 RX ORDER — FENTANYL CITRATE 50 UG/ML
50 INJECTION, SOLUTION INTRAMUSCULAR; INTRAVENOUS
Status: DISCONTINUED | OUTPATIENT
Start: 2022-05-26 | End: 2022-05-26 | Stop reason: HOSPADM

## 2022-05-26 RX ORDER — SODIUM CHLORIDE 0.9 % (FLUSH) 0.9 %
3-10 SYRINGE (ML) INJECTION AS NEEDED
Status: DISCONTINUED | OUTPATIENT
Start: 2022-05-26 | End: 2022-05-26 | Stop reason: HOSPADM

## 2022-05-26 RX ORDER — PROPOFOL 10 MG/ML
VIAL (ML) INTRAVENOUS AS NEEDED
Status: DISCONTINUED | OUTPATIENT
Start: 2022-05-26 | End: 2022-05-26 | Stop reason: SURG

## 2022-05-26 RX ORDER — HYDROMORPHONE HYDROCHLORIDE 1 MG/ML
0.5 INJECTION, SOLUTION INTRAMUSCULAR; INTRAVENOUS; SUBCUTANEOUS
Status: DISCONTINUED | OUTPATIENT
Start: 2022-05-26 | End: 2022-05-26 | Stop reason: HOSPADM

## 2022-05-26 RX ORDER — ONDANSETRON 2 MG/ML
4 INJECTION INTRAMUSCULAR; INTRAVENOUS ONCE AS NEEDED
Status: DISCONTINUED | OUTPATIENT
Start: 2022-05-26 | End: 2022-05-26 | Stop reason: HOSPADM

## 2022-05-26 RX ORDER — HYDROCODONE BITARTRATE AND ACETAMINOPHEN 5; 325 MG/1; MG/1
1 TABLET ORAL EVERY 4 HOURS PRN
Qty: 30 TABLET | Refills: 0 | Status: SHIPPED | OUTPATIENT
Start: 2022-05-26

## 2022-05-26 RX ORDER — PROPOFOL 10 MG/ML
VIAL (ML) INTRAVENOUS
Status: COMPLETED
Start: 2022-05-26 | End: 2022-05-26

## 2022-05-26 RX ORDER — DEXAMETHASONE SODIUM PHOSPHATE 4 MG/ML
INJECTION, SOLUTION INTRA-ARTICULAR; INTRALESIONAL; INTRAMUSCULAR; INTRAVENOUS; SOFT TISSUE
Status: COMPLETED | OUTPATIENT
Start: 2022-05-26 | End: 2022-05-26

## 2022-05-26 RX ORDER — SODIUM CHLORIDE 0.9 % (FLUSH) 0.9 %
3 SYRINGE (ML) INJECTION EVERY 12 HOURS SCHEDULED
Status: DISCONTINUED | OUTPATIENT
Start: 2022-05-26 | End: 2022-05-26 | Stop reason: HOSPADM

## 2022-05-26 RX ORDER — LIDOCAINE HYDROCHLORIDE AND EPINEPHRINE 10; 10 MG/ML; UG/ML
INJECTION, SOLUTION INFILTRATION; PERINEURAL AS NEEDED
Status: DISCONTINUED | OUTPATIENT
Start: 2022-05-26 | End: 2022-05-26 | Stop reason: HOSPADM

## 2022-05-26 RX ORDER — FENTANYL CITRATE 50 UG/ML
100 INJECTION, SOLUTION INTRAMUSCULAR; INTRAVENOUS
Status: DISCONTINUED | OUTPATIENT
Start: 2022-05-26 | End: 2022-05-26 | Stop reason: HOSPADM

## 2022-05-26 RX ORDER — FLUMAZENIL 0.1 MG/ML
0.2 INJECTION INTRAVENOUS AS NEEDED
Status: DISCONTINUED | OUTPATIENT
Start: 2022-05-26 | End: 2022-05-26 | Stop reason: HOSPADM

## 2022-05-26 RX ORDER — HYDROCODONE BITARTRATE AND ACETAMINOPHEN 5; 325 MG/1; MG/1
1 TABLET ORAL ONCE AS NEEDED
Status: DISCONTINUED | OUTPATIENT
Start: 2022-05-26 | End: 2022-05-26 | Stop reason: HOSPADM

## 2022-05-26 RX ORDER — MAGNESIUM HYDROXIDE 1200 MG/15ML
LIQUID ORAL AS NEEDED
Status: DISCONTINUED | OUTPATIENT
Start: 2022-05-26 | End: 2022-05-26 | Stop reason: HOSPADM

## 2022-05-26 RX ORDER — MIDAZOLAM HYDROCHLORIDE 1 MG/ML
2 INJECTION INTRAMUSCULAR; INTRAVENOUS
Status: COMPLETED | OUTPATIENT
Start: 2022-05-26 | End: 2022-05-26

## 2022-05-26 RX ORDER — ONDANSETRON 2 MG/ML
INJECTION INTRAMUSCULAR; INTRAVENOUS AS NEEDED
Status: DISCONTINUED | OUTPATIENT
Start: 2022-05-26 | End: 2022-05-26 | Stop reason: SURG

## 2022-05-26 RX ORDER — ROPIVACAINE HYDROCHLORIDE 5 MG/ML
INJECTION, SOLUTION EPIDURAL; INFILTRATION; PERINEURAL
Status: COMPLETED | OUTPATIENT
Start: 2022-05-26 | End: 2022-05-26

## 2022-05-26 RX ORDER — SODIUM CHLORIDE, SODIUM LACTATE, POTASSIUM CHLORIDE, CALCIUM CHLORIDE 600; 310; 30; 20 MG/100ML; MG/100ML; MG/100ML; MG/100ML
9 INJECTION, SOLUTION INTRAVENOUS CONTINUOUS
Status: DISCONTINUED | OUTPATIENT
Start: 2022-05-26 | End: 2022-05-26 | Stop reason: HOSPADM

## 2022-05-26 RX ORDER — LIDOCAINE HYDROCHLORIDE 10 MG/ML
0.5 INJECTION, SOLUTION EPIDURAL; INFILTRATION; INTRACAUDAL; PERINEURAL ONCE AS NEEDED
Status: DISCONTINUED | OUTPATIENT
Start: 2022-05-26 | End: 2022-05-26 | Stop reason: HOSPADM

## 2022-05-26 RX ORDER — EPHEDRINE SULFATE 50 MG/ML
5 INJECTION, SOLUTION INTRAVENOUS ONCE AS NEEDED
Status: DISCONTINUED | OUTPATIENT
Start: 2022-05-26 | End: 2022-05-26 | Stop reason: HOSPADM

## 2022-05-26 RX ORDER — HYDROCODONE BITARTRATE AND ACETAMINOPHEN 7.5; 325 MG/1; MG/1
1 TABLET ORAL EVERY 4 HOURS PRN
Status: DISCONTINUED | OUTPATIENT
Start: 2022-05-26 | End: 2022-05-26 | Stop reason: HOSPADM

## 2022-05-26 RX ORDER — FENTANYL CITRATE 50 UG/ML
50 INJECTION, SOLUTION INTRAMUSCULAR; INTRAVENOUS
Status: COMPLETED | OUTPATIENT
Start: 2022-05-26 | End: 2022-05-26

## 2022-05-26 RX ORDER — CEFAZOLIN SODIUM 2 G/100ML
2 INJECTION, SOLUTION INTRAVENOUS ONCE
Status: COMPLETED | OUTPATIENT
Start: 2022-05-26 | End: 2022-05-26

## 2022-05-26 RX ORDER — LIDOCAINE HYDROCHLORIDE 20 MG/ML
INJECTION, SOLUTION INFILTRATION; PERINEURAL AS NEEDED
Status: DISCONTINUED | OUTPATIENT
Start: 2022-05-26 | End: 2022-05-26 | Stop reason: SURG

## 2022-05-26 RX ADMIN — MIDAZOLAM HYDROCHLORIDE 2 MG: 2 INJECTION, SOLUTION INTRAMUSCULAR; INTRAVENOUS at 11:58

## 2022-05-26 RX ADMIN — FENTANYL CITRATE 50 MCG: 0.05 INJECTION, SOLUTION INTRAMUSCULAR; INTRAVENOUS at 11:58

## 2022-05-26 RX ADMIN — PROPOFOL 50 MG: 10 INJECTION, EMULSION INTRAVENOUS at 12:55

## 2022-05-26 RX ADMIN — FENTANYL CITRATE 50 MCG: 0.05 INJECTION, SOLUTION INTRAMUSCULAR; INTRAVENOUS at 13:05

## 2022-05-26 RX ADMIN — FENTANYL CITRATE 25 MCG: 0.05 INJECTION, SOLUTION INTRAMUSCULAR; INTRAVENOUS at 13:00

## 2022-05-26 RX ADMIN — MIDAZOLAM HYDROCHLORIDE 1 MG: 2 INJECTION, SOLUTION INTRAMUSCULAR; INTRAVENOUS at 12:25

## 2022-05-26 RX ADMIN — FENTANYL CITRATE 50 MCG: 0.05 INJECTION, SOLUTION INTRAMUSCULAR; INTRAVENOUS at 12:02

## 2022-05-26 RX ADMIN — ONDANSETRON 4 MG: 2 INJECTION INTRAMUSCULAR; INTRAVENOUS at 12:55

## 2022-05-26 RX ADMIN — ROPIVACAINE HYDROCHLORIDE 20 ML: 5 INJECTION EPIDURAL; INFILTRATION; PERINEURAL at 12:17

## 2022-05-26 RX ADMIN — MEPIVACAINE HYDROCHLORIDE 10 ML: 15 INJECTION, SOLUTION EPIDURAL; INFILTRATION at 12:17

## 2022-05-26 RX ADMIN — FENTANYL CITRATE 25 MCG: 0.05 INJECTION, SOLUTION INTRAMUSCULAR; INTRAVENOUS at 13:03

## 2022-05-26 RX ADMIN — SODIUM CHLORIDE, POTASSIUM CHLORIDE, SODIUM LACTATE AND CALCIUM CHLORIDE 9 ML/HR: 600; 310; 30; 20 INJECTION, SOLUTION INTRAVENOUS at 11:45

## 2022-05-26 RX ADMIN — PROPOFOL 140 MCG/KG/MIN: 10 INJECTION, EMULSION INTRAVENOUS at 12:56

## 2022-05-26 RX ADMIN — LIDOCAINE HYDROCHLORIDE 60 MG: 20 INJECTION, SOLUTION INFILTRATION; PERINEURAL at 12:55

## 2022-05-26 RX ADMIN — DEXAMETHASONE SODIUM PHOSPHATE 4 MG: 4 INJECTION, SOLUTION INTRAMUSCULAR; INTRAVENOUS at 12:17

## 2022-05-26 RX ADMIN — CEFAZOLIN SODIUM 2 G: 2 INJECTION, SOLUTION INTRAVENOUS at 12:38

## 2022-05-26 NOTE — ANESTHESIA POSTPROCEDURE EVALUATION
Patient: Bello Regan    Procedure Summary     Date: 05/26/22 Room / Location: [facility]  /  [facility]    Anesthesia Start: 1247 Anesthesia Stop: 1447    Procedure: RIGHT DISTAL RADIUS FRACTURE OPEN REDUCTION INTERNAL FIXATION (Right Wrist) Diagnosis:     Surgeons: Robret Crook MD Provider: Gerry Islas MD    Anesthesia Type: regional ASA Status: 2          Anesthesia Type: regional    Vitals  Vitals Value Taken Time   /86 05/26/22 1455   Temp     Pulse 82 05/26/22 1455   Resp 18 05/26/22 1455   SpO2 99 % 05/26/22 1455           Post Anesthesia Care and Evaluation    Patient location during evaluation: bedside  Patient participation: complete - patient participated  Level of consciousness: awake  Pain management: adequate  Airway patency: patent  Anesthetic complications: No anesthetic complications  PONV Status: controlled  Cardiovascular status: acceptable  Respiratory status: acceptable  Hydration status: acceptable    Comments: --------------------            05/26/22               1455     --------------------   BP:       145/86     Pulse:      82       Resp:       18       Temp:                SpO2:      99%      --------------------

## 2022-05-26 NOTE — ANESTHESIA PROCEDURE NOTES
Peripheral Block    Pre-sedation assessment completed: 5/26/2022 12:07 PM    Patient reassessed immediately prior to procedure    Patient location during procedure: pre-op  Start time: 5/26/2022 12:07 PM  Stop time: 5/26/2022 12:17 PM  Reason for block: at surgeon's request and post-op pain management  Performed by  Anesthesiologist: Irving Rowland MD  Preanesthetic Checklist  Completed: patient identified, IV checked, site marked, risks and benefits discussed, surgical consent, monitors and equipment checked, pre-op evaluation and timeout performed  Prep:  Pt Position: supine  Sterile barriers:cap, gloves, mask and sterile barriers  Prep: ChloraPrep  Patient monitoring: blood pressure monitoring, continuous pulse oximetry and EKG  Procedure    Sedation: yes  Performed under: local infiltration  Guidance:ultrasound guided    ULTRASOUND INTERPRETATION.  Using ultrasound guidance a 22 G gauge needle was placed in close proximity to the brachial plexus nerve, at which point, under ultrasound guidance anesthetic was injected in the area of the nerve and spread of the anesthesia was seen on ultrasound in close proximity thereto.  There were no abnormalities seen on ultrasound; a digital image was taken; and the patient tolerated the procedure with no complications. Images:still images obtained, printed/placed on chart (U/S to localize the nerve)    Laterality:right  Block Type:supraclavicular  Injection Technique:single-shot  Needle Type:echogenic  Needle Gauge:22 G  Resistance on Injection: less than 15 psi    Medications Used: dexamethasone (DECADRON) injection, 4 mg  ropivacaine (NAROPIN) 0.5 % injection, 20 mL  Mepivacaine HCl (PF) (CARBOCAINE) 1.5 % injection, 10 mL  Med administered at 5/26/2022 12:17 PM      Post Assessment  Injection Assessment: negative aspiration for heme, no paresthesia on injection and incremental injection  Patient Tolerance:comfortable throughout block  Complications:no

## 2022-05-26 NOTE — OP NOTE
Orthopaedic & Hand Surgery  Wrist Fracture Operative Report  Dr. Robert Crook  [phone]      PATIENT NAME: Bello Regan  MRN: [id number]  : 1995 AGE: 27 y.o. GENDER: female  DATE OF OPERATION: 2022  PREOPERATIVE DIAGNOSIS:   • Right intraarticular distal radial fracture with two fragments  POSTOPERATIVE DIAGNOSIS:   • Right intraarticular distal radial fracture with two fragments  OPERATION PERFORMED:   • Open treatment of intraarticular distal radial fracture with two fragments (CPT 65309)  SURGEON: Robert Crook MD, PhD  ANESTHESIA: Local and Sedation with Block  ASSISTANT: None  ESTIMATED BLOOD LOSS: <5 ml  SPONGE AND NEEDLE COUNT: Correct  INDICATIONS: The patient is as 27 y.o. female, who sustained a displaced distal radius fracture following a fall onto an outstretched hand while rollerskating. Given the unstable and intraarticular pattern, it was recommended that she undergo open reduction internal fixation. The risks of surgery were discussed and included Pain, Bleeding, Infection, Complications of anesthesia, Damage to blood vessels, nerves and other surrounding structures, Stiffness, Scar, Further procedures, Nonunion or malunion, Hardware Failure and Unforeseen risks of surgery including stroke, heart stoppage or death. No guarantees were made. Following a thorough discussion, questions were solicited and answered to her satisfaction. Verbal and written consent were obtained prior to proceeding with surgery.    COMPONENTS:   • [employer] Plate and Screws    PERTINENT FINDINGS:   • Following fixation:  o There was restoration of anatomic volar tilt and radial inclination   o The fracture was stable taking the wrist through a range of motion and without crepitance.   o The DRUJ was stable following internal fixation    TOURNIQUET TIME:   • 48 Minutes    DETAILS OF PROCEDURE:  The patient was met in the preoperative area. The site was marked. The consent and H&P were reviewed. The  patient was then wheeled back to the operative suite and transferred to the operative table with the operative limb extended onto a hand table. The patient submitted to anesthesia. A tourniquet was placed on the operative upper arm. Surgical alcohol was used to thoroughly clean the entire operative extremity. The operative arm was then prepped in the normal sterile fashion, which included Chloroprep and multiple layers of sterile drapes. After a surgical timeout in which the patient's identity, the surgical side/site, planned procedure, and the administration of preoperative antibiotics were confirmed, the limb was exsanguinated with an Esmarch bandage and the tourniquet was inflated.    The standard trans-FCR approach to the volar distal radius was performed. Incision was made in line with the FCR. FCR sheath and subsheath were divided. Care was taken to avoid damage to the palmar cutaneous branch of the median nerve. Floor of the FCR subsheath was entered. Pronator quadratus was identified and reflected off of the radius in an ulnarward fashion. This gave us full exposure to the fracture. We then elevated the brachioradialis off the distal radius fragment to allow for the reduction. We pronated the radius out of the way, and released the dorsal periosteum.    We selected a plate from the [employer] distal radius set and provisionally fixed it to the radius using nonlocking screws. Following this, a reduction maneuver was then performed with longitudinal traction volar flexion and ulnar translation. This brought over the distal fragment, and with ligamentotaxis reduced the dorsal lunate facet fragment. The fracture was provisionally fixed with wires through the plate. Position was checked in the AP and lateral fluoroscopic views and found to be acceptable. We then placed locking pegs in the distal metaphyseal fragment, taking 2 mm off of each measured length and position was checked in the AP, lateral and dorsal  tangential fluoroscopic views. We were able to confirm that the screws were subchondral and not penetrating the joint. After this, the final fixation was carried out by placing additional locking screws into the shaft fragment. Final position was checked in the AP, lateral fluoroscopic views and found to have restored the distal radius anatomy. Screw and peg positions were acceptable.     The wound was thoroughly irrigated. Pronator quadratus was repaired. Tourniquet was deflated at 48 min. Bleeding was controlled with a combination of direct pressure and bipolar cautery. The wound was closed in layers. We then checked the DRUJ and it was found to be stable.    A sterile dressing and volar resting splint were applied and secured with an ACE bandage. Anesthesia was reversed without complication, the patient was transferred to the [facility] and taken to the recovery room in stable condition. Sponge and needle count were reported to me as correct. There were no immediate complications. Patient tolerated the procedure well.    Post Operative Plan:   • Wound & dressing care  o Keep the postoperative dressing in place until the first post-op visit  o Once the postoperative dressing is removed, the patient may shower with regular soap and water.  o No immersion until 1 week following suture removal  • Weightbearing & ROM  o Weight bearing is limited to no lifting greater than:  - 1 pound until 2 weeks postop  - 5 pounds until 6 weeks postop  - 25 pounds until 12 weeks postop.  o Range of motion of the fingers and wrist is unrestricted and may be done as tolerated.  • Therapy  o A postsurgical follow-up, the patient will be provided with a volar forearm-based wrist brace, and will receive instructions for a home exercise program.  o The patient will be encouraged to do unrestricted finger and wrist range of motion exercises at home.  • Brace wear  o Brace wear should be full time for the first 2 weeks, except for bathing and  ROM exercises.   - The brace may be removed anytime for ROM exercises.  o After 2 weeks, she should not wear the brace inside the home and only wear the brace when out of the house.  o By 4 weeks, the orthosis should be discontinued completely.  • Follow-up  o Follow-up as scheduled with Dr. Crook in 1-2 weeks. At that time, she will have her wounds checked and range of motion checked.     oRbert Crook MD, PhD  Orthopaedic & Hand Surgery  [facility]  [phone] - [place] [facility]  [phone] - [facility]

## 2022-05-26 NOTE — INTERVAL H&P NOTE
H&P reviewed. The patient was examined and there are no changes to the H&P.    Robert Crook MD, PhD  Orthopaedic & Hand Surgery  [facility]  [phone] - [place] Office  [phone] - [facility]

## 2022-10-12 ENCOUNTER — LAB (OUTPATIENT)
Dept: LAB | Facility: HOSPITAL | Age: 27
End: 2022-10-12

## 2022-10-12 ENCOUNTER — TRANSCRIBE ORDERS (OUTPATIENT)
Dept: ADMINISTRATIVE | Facility: HOSPITAL | Age: 27
End: 2022-10-12

## 2022-10-12 DIAGNOSIS — J30.89 PERENNIAL ALLERGIC RHINITIS: ICD-10-CM

## 2022-10-12 DIAGNOSIS — L50.9 URTICARIA, UNSPECIFIED: ICD-10-CM

## 2022-10-12 DIAGNOSIS — Z91.018 ALLERGY TO OTHER FOODS: ICD-10-CM

## 2022-10-12 DIAGNOSIS — T78.2XXA ANAPHYLACTIC SHOCK: ICD-10-CM

## 2022-10-12 DIAGNOSIS — L29.9 PRURITUS OF SKIN: ICD-10-CM

## 2022-10-12 DIAGNOSIS — J30.1 ALLERGIC RHINITIS DUE TO POLLEN, UNSPECIFIED SEASONALITY: Primary | ICD-10-CM

## 2022-10-12 DIAGNOSIS — J30.1 ALLERGIC RHINITIS DUE TO POLLEN, UNSPECIFIED SEASONALITY: ICD-10-CM

## 2022-10-12 LAB
ALBUMIN SERPL-MCNC: 4.2 G/DL (ref 3.5–5.2)
ALBUMIN/GLOB SERPL: 1.4 G/DL
ALP SERPL-CCNC: 52 U/L (ref 39–117)
ALT SERPL W P-5'-P-CCNC: 12 U/L (ref 1–33)
ANION GAP SERPL CALCULATED.3IONS-SCNC: 11.8 MMOL/L (ref 5–15)
AST SERPL-CCNC: 15 U/L (ref 1–32)
BASOPHILS # BLD MANUAL: 0.04 10*3/MM3 (ref 0–0.2)
BASOPHILS NFR BLD MANUAL: 1 % (ref 0–1.5)
BILIRUB SERPL-MCNC: <0.2 MG/DL (ref 0–1.2)
BUN SERPL-MCNC: 9 MG/DL (ref 6–20)
BUN/CREAT SERPL: 9.4 (ref 7–25)
CALCIUM SPEC-SCNC: 9.8 MG/DL (ref 8.6–10.5)
CHLORIDE SERPL-SCNC: 102 MMOL/L (ref 98–107)
CO2 SERPL-SCNC: 26.2 MMOL/L (ref 22–29)
CREAT SERPL-MCNC: 0.96 MG/DL (ref 0.57–1)
DEPRECATED RDW RBC AUTO: 36 FL (ref 37–54)
EGFRCR SERPLBLD CKD-EPI 2021: 83.3 ML/MIN/1.73
EOSINOPHIL # BLD MANUAL: 0.04 10*3/MM3 (ref 0–0.4)
EOSINOPHIL NFR BLD MANUAL: 1 % (ref 0.3–6.2)
ERYTHROCYTE [DISTWIDTH] IN BLOOD BY AUTOMATED COUNT: 14.8 % (ref 12.3–15.4)
GLOBULIN UR ELPH-MCNC: 2.9 GM/DL
GLUCOSE SERPL-MCNC: 84 MG/DL (ref 65–99)
HCT VFR BLD AUTO: 37.8 % (ref 34–46.6)
HGB BLD-MCNC: 12.4 G/DL (ref 12–15.9)
LYMPHOCYTES # BLD MANUAL: 1.72 10*3/MM3 (ref 0.7–3.1)
LYMPHOCYTES NFR BLD MANUAL: 6.2 % (ref 5–12)
MCH RBC QN AUTO: 23 PG (ref 26.6–33)
MCHC RBC AUTO-ENTMCNC: 32.8 G/DL (ref 31.5–35.7)
MCV RBC AUTO: 70 FL (ref 79–97)
MONOCYTES # BLD: 0.27 10*3/MM3 (ref 0.1–0.9)
NEUTROPHILS # BLD AUTO: 2.21 10*3/MM3 (ref 1.7–7)
NEUTROPHILS NFR BLD MANUAL: 51.5 % (ref 42.7–76)
PLAT MORPH BLD: NORMAL
PLATELET # BLD AUTO: 335 10*3/MM3 (ref 140–450)
PMV BLD AUTO: 10.6 FL (ref 6–12)
POTASSIUM SERPL-SCNC: 4.2 MMOL/L (ref 3.5–5.2)
PROT SERPL-MCNC: 7.1 G/DL (ref 6–8.5)
RBC # BLD AUTO: 5.4 10*6/MM3 (ref 3.77–5.28)
RBC MORPH BLD: NORMAL
SODIUM SERPL-SCNC: 140 MMOL/L (ref 136–145)
TSH SERPL DL<=0.05 MIU/L-ACNC: 0.71 UIU/ML (ref 0.27–4.2)
VARIANT LYMPHS NFR BLD MANUAL: 40.2 % (ref 19.6–45.3)
WBC MORPH BLD: NORMAL
WBC NRBC COR # BLD: 4.29 10*3/MM3 (ref 3.4–10.8)

## 2022-10-12 PROCEDURE — 85007 BL SMEAR W/DIFF WBC COUNT: CPT

## 2022-10-12 PROCEDURE — 83520 IMMUNOASSAY QUANT NOS NONAB: CPT

## 2022-10-12 PROCEDURE — 84443 ASSAY THYROID STIM HORMONE: CPT

## 2022-10-12 PROCEDURE — 85025 COMPLETE CBC W/AUTO DIFF WBC: CPT

## 2022-10-12 PROCEDURE — 80053 COMPREHEN METABOLIC PANEL: CPT

## 2022-10-12 PROCEDURE — 36415 COLL VENOUS BLD VENIPUNCTURE: CPT

## 2022-10-15 LAB — TRYPTASE SERPL-MCNC: 3.2 UG/L (ref 2.2–13.2)

## (undated) DEVICE — PK ORTHO MINOR TOWER 40

## (undated) DEVICE — KT DRP MINIVIEW STRL

## (undated) DEVICE — Device

## (undated) DEVICE — DRSNG GZ PETROLTM XEROFORM CURAD 1X8IN STRL

## (undated) DEVICE — TWIST DRILL Ø2.0MM X 40MM, L91MM, AO: Brand: APTUS

## (undated) DEVICE — SUT MNCRYL 3/0 RB1 27IN UD Y215H

## (undated) DEVICE — VESSEL LOOPS X-RAY DETECTABLE: Brand: DEROYAL

## (undated) DEVICE — DRAPE,HAND,STERILE: Brand: MEDLINE

## (undated) DEVICE — DRAPE,U/ SHT,SPLIT,PLAS,STERIL: Brand: MEDLINE

## (undated) DEVICE — SPNG GZ WOVN 4X4IN 12PLY 10/BX STRL

## (undated) DEVICE — DISPOSABLE BIPOLAR FORCEPS 4" (10.2CM) JEWELERS, STRAIGHT 0.4MM TIP AND 12 FT. (3.6M) CABLE: Brand: KIRWAN

## (undated) DEVICE — PATIENT RETURN ELECTRODE, SINGLE-USE, CONTACT QUALITY MONITORING, ADULT, WITH 9FT CORD, FOR PATIENTS WEIGING OVER 33LBS. (15KG): Brand: MEGADYNE

## (undated) DEVICE — DISPOSABLE TOURNIQUET CUFF SINGLE BLADDER, SINGLE PORT AND QUICK CONNECT CONNECTOR: Brand: COLOR CUFF

## (undated) DEVICE — SUT PROLN 4/0 FS2 18IN 8683G

## (undated) DEVICE — BNDG ELAS MATRX  2IN 5YD LF STRL

## (undated) DEVICE — 2.5 CORTICAL SCREW 22MM, HD7, 1/PKG
Type: IMPLANTABLE DEVICE | Site: WRIST | Status: NON-FUNCTIONAL
Brand: APTUS
Removed: 2022-05-26

## (undated) DEVICE — SPLNT PLSTR 10 FAST 4X15IN

## (undated) DEVICE — APPL CHLORAPREP HI/LITE 26ML ORNG

## (undated) DEVICE — 1.6 K-WIRE, TROCAR, 150MM, 1/PKG
Type: IMPLANTABLE DEVICE | Site: WRIST | Status: NON-FUNCTIONAL
Brand: APTUS
Removed: 2022-05-26

## (undated) DEVICE — TOWEL,OR,DSP,ST,BLUE,STD,4/PK,20PK/CS: Brand: MEDLINE

## (undated) DEVICE — WEBRIL* CAST PADDING: Brand: DEROYAL

## (undated) DEVICE — UNDRGLV SURG BIOGEL PUNCTUREINDICATION SZ7 PF STRL

## (undated) DEVICE — PENCL ES MEGADINE EZ/CLEAN BUTN W/HOLSTR 10FT

## (undated) DEVICE — GLV SURG BIOGEL LTX PF 7

## (undated) DEVICE — ARM SLING: Brand: DEROYAL

## (undated) DEVICE — 2.5/2.8 SCREWDRIVER BLADE, HD7, AO: Brand: APTUS

## (undated) DEVICE — ELECTRD BLD EZ CLN MOD 2.5IN

## (undated) DEVICE — SOL ISO/ALC RUB 70PCT 4OZ

## (undated) DEVICE — RUBBERBAND LF STRL PK/2